# Patient Record
Sex: MALE | Race: WHITE
[De-identification: names, ages, dates, MRNs, and addresses within clinical notes are randomized per-mention and may not be internally consistent; named-entity substitution may affect disease eponyms.]

---

## 2017-05-16 ENCOUNTER — HOSPITAL ENCOUNTER (OUTPATIENT)
Dept: HOSPITAL 76 - LAB.F | Age: 67
Discharge: HOME | End: 2017-05-16
Attending: INTERNAL MEDICINE
Payer: MEDICARE

## 2017-05-16 DIAGNOSIS — I50.32: Primary | ICD-10-CM

## 2017-05-16 LAB
ANION GAP SERPL CALCULATED.4IONS-SCNC: 6 MMOL/L (ref 6–13)
BUN SERPL-MCNC: 26 MG/DL (ref 6–20)
CALCIUM UR-MCNC: 9.6 MG/DL (ref 8.5–10.3)
CHLORIDE SERPL-SCNC: 102 MMOL/L (ref 101–111)
CO2 SERPL-SCNC: 30 MMOL/L (ref 21–32)
CREAT SERPLBLD-SCNC: 1.4 MG/DL (ref 0.6–1.2)
GFRSERPLBLD MDRD-ARVRAT: 50 ML/MIN/{1.73_M2} (ref 89–?)
GLUCOSE SERPL-MCNC: 156 MG/DL (ref 70–100)
POTASSIUM SERPL-SCNC: 4.7 MMOL/L (ref 3.5–5)
SODIUM SERPLBLD-SCNC: 138 MMOL/L (ref 135–145)

## 2017-05-16 PROCEDURE — 80048 BASIC METABOLIC PNL TOTAL CA: CPT

## 2017-05-16 PROCEDURE — 36415 COLL VENOUS BLD VENIPUNCTURE: CPT

## 2017-05-30 ENCOUNTER — HOSPITAL ENCOUNTER (OUTPATIENT)
Dept: HOSPITAL 76 - LAB.F | Age: 67
Discharge: HOME | End: 2017-05-30
Attending: INTERNAL MEDICINE
Payer: MEDICARE

## 2017-05-30 DIAGNOSIS — I48.91: Primary | ICD-10-CM

## 2017-05-30 LAB
ANION GAP SERPL CALCULATED.4IONS-SCNC: 5 MMOL/L (ref 6–13)
BUN SERPL-MCNC: 22 MG/DL (ref 6–20)
CALCIUM UR-MCNC: 9.6 MG/DL (ref 8.5–10.3)
CHLORIDE SERPL-SCNC: 107 MMOL/L (ref 101–111)
CO2 SERPL-SCNC: 26 MMOL/L (ref 21–32)
CREAT SERPLBLD-SCNC: 1 MG/DL (ref 0.6–1.2)
GFRSERPLBLD MDRD-ARVRAT: 74 ML/MIN/{1.73_M2} (ref 89–?)
GLUCOSE SERPL-MCNC: 134 MG/DL (ref 70–100)
POTASSIUM SERPL-SCNC: 4.2 MMOL/L (ref 3.5–5)
SODIUM SERPLBLD-SCNC: 138 MMOL/L (ref 135–145)

## 2017-05-30 PROCEDURE — 36415 COLL VENOUS BLD VENIPUNCTURE: CPT

## 2017-05-30 PROCEDURE — 80048 BASIC METABOLIC PNL TOTAL CA: CPT

## 2020-06-15 ENCOUNTER — HOSPITAL ENCOUNTER (EMERGENCY)
Dept: HOSPITAL 76 - ED | Age: 70
Discharge: HOME | End: 2020-06-15
Payer: MEDICARE

## 2020-06-15 VITALS — SYSTOLIC BLOOD PRESSURE: 148 MMHG | DIASTOLIC BLOOD PRESSURE: 77 MMHG

## 2020-06-15 DIAGNOSIS — I48.91: ICD-10-CM

## 2020-06-15 DIAGNOSIS — N28.9: Primary | ICD-10-CM

## 2020-06-15 DIAGNOSIS — E11.9: ICD-10-CM

## 2020-06-15 DIAGNOSIS — Z95.2: ICD-10-CM

## 2020-06-15 DIAGNOSIS — Z79.84: ICD-10-CM

## 2020-06-15 DIAGNOSIS — Z91.81: ICD-10-CM

## 2020-06-15 DIAGNOSIS — R41.0: ICD-10-CM

## 2020-06-15 DIAGNOSIS — G89.29: ICD-10-CM

## 2020-06-15 DIAGNOSIS — R60.0: ICD-10-CM

## 2020-06-15 DIAGNOSIS — Z79.01: ICD-10-CM

## 2020-06-15 DIAGNOSIS — M17.11: ICD-10-CM

## 2020-06-15 DIAGNOSIS — Z79.82: ICD-10-CM

## 2020-06-15 DIAGNOSIS — I51.9: ICD-10-CM

## 2020-06-15 LAB
ALBUMIN DIAFP-MCNC: 3.8 G/DL (ref 3.2–5.5)
ALBUMIN/GLOB SERPL: 1.4 {RATIO} (ref 1–2.2)
ALP SERPL-CCNC: 45 IU/L (ref 42–121)
ALT SERPL W P-5'-P-CCNC: 26 IU/L (ref 10–60)
AMPHET UR QL SCN: NEGATIVE
ANION GAP SERPL CALCULATED.4IONS-SCNC: 11 MMOL/L (ref 6–13)
APAP SERPL-MCNC: < 10 UG/ML (ref 10–30)
AST SERPL W P-5'-P-CCNC: 58 IU/L (ref 10–42)
BASOPHILS NFR BLD AUTO: 0 10^3/UL (ref 0–0.1)
BASOPHILS NFR BLD AUTO: 0.4 %
BENZODIAZ UR QL SCN: POSITIVE
BILIRUB BLD-MCNC: 1.1 MG/DL (ref 0.2–1)
BUN SERPL-MCNC: 52 MG/DL (ref 6–20)
CALCIUM UR-MCNC: 8.5 MG/DL (ref 8.5–10.3)
CHLORIDE SERPL-SCNC: 95 MMOL/L (ref 101–111)
CLARITY UR REFRACT.AUTO: CLEAR
CO2 SERPL-SCNC: 26 MMOL/L (ref 21–32)
COCAINE UR-SCNC: NEGATIVE UMOL/L
CREAT SERPLBLD-SCNC: 2.9 MG/DL (ref 0.6–1.2)
EOSINOPHIL # BLD AUTO: 0.1 10^3/UL (ref 0–0.7)
EOSINOPHIL NFR BLD AUTO: 1.6 %
ERYTHROCYTE [DISTWIDTH] IN BLOOD BY AUTOMATED COUNT: 11.9 % (ref 12–15)
GLOBULIN SER-MCNC: 2.8 G/DL (ref 2.1–4.2)
GLUCOSE SERPL-MCNC: 94 MG/DL (ref 70–100)
GLUCOSE UR QL STRIP.AUTO: NEGATIVE MG/DL
HGB UR QL STRIP: 14.3 G/DL (ref 14–18)
KETONES UR QL STRIP.AUTO: NEGATIVE MG/DL
LIPASE SERPL-CCNC: 32 U/L (ref 22–51)
LYMPHOCYTES # SPEC AUTO: 1.5 10^3/UL (ref 1.5–3.5)
LYMPHOCYTES NFR BLD AUTO: 22.7 %
MCH RBC QN AUTO: 34 PG (ref 27–31)
MCHC RBC AUTO-ENTMCNC: 33.4 G/DL (ref 32–36)
MCV RBC AUTO: 101.7 FL (ref 80–94)
METHADONE UR QL SCN: NEGATIVE
METHAMPHET UR QL SCN: NEGATIVE
MONOCYTES # BLD AUTO: 1 10^3/UL (ref 0–1)
MONOCYTES NFR BLD AUTO: 14.7 %
NEUTROPHILS # BLD AUTO: 4.1 10^3/UL (ref 1.5–6.6)
NEUTROPHILS # SNV AUTO: 6.7 X10^3/UL (ref 4.8–10.8)
NEUTROPHILS NFR BLD AUTO: 60.3 %
NITRITE UR QL STRIP.AUTO: NEGATIVE
OPIATES UR QL SCN: NEGATIVE
PDW BLD AUTO: 9.7 FL (ref 7.4–11.4)
PH UR STRIP.AUTO: 7 PH (ref 5–7.5)
PLATELET # BLD: 142 10^3/UL (ref 130–450)
PROT SPEC-MCNC: 6.6 G/DL (ref 6.7–8.2)
PROT UR STRIP.AUTO-MCNC: NEGATIVE MG/DL
RBC # UR STRIP.AUTO: (no result) /UL
RBC MAR: 4.21 10^6/UL (ref 4.7–6.1)
SALICYLATES SERPL-MCNC: < 6 MG/DL
SODIUM SERPLBLD-SCNC: 132 MMOL/L (ref 135–145)
SP GR UR STRIP.AUTO: <=1.005 (ref 1–1.03)
UROBILINOGEN UR QL STRIP.AUTO: (no result) E.U./DL
UROBILINOGEN UR STRIP.AUTO-MCNC: NEGATIVE MG/DL
VOLATILE DRUGS POS SERPL SCN: (no result)

## 2020-06-15 PROCEDURE — 80053 COMPREHEN METABOLIC PANEL: CPT

## 2020-06-15 PROCEDURE — 81003 URINALYSIS AUTO W/O SCOPE: CPT

## 2020-06-15 PROCEDURE — 81001 URINALYSIS AUTO W/SCOPE: CPT

## 2020-06-15 PROCEDURE — 85025 COMPLETE CBC W/AUTO DIFF WBC: CPT

## 2020-06-15 PROCEDURE — 80306 DRUG TEST PRSMV INSTRMNT: CPT

## 2020-06-15 PROCEDURE — 72125 CT NECK SPINE W/O DYE: CPT

## 2020-06-15 PROCEDURE — 71045 X-RAY EXAM CHEST 1 VIEW: CPT

## 2020-06-15 PROCEDURE — 83690 ASSAY OF LIPASE: CPT

## 2020-06-15 PROCEDURE — 80320 DRUG SCREEN QUANTALCOHOLS: CPT

## 2020-06-15 PROCEDURE — 80329 ANALGESICS NON-OPIOID 1 OR 2: CPT

## 2020-06-15 PROCEDURE — 80307 DRUG TEST PRSMV CHEM ANLYZR: CPT

## 2020-06-15 PROCEDURE — 99284 EMERGENCY DEPT VISIT MOD MDM: CPT

## 2020-06-15 PROCEDURE — 70450 CT HEAD/BRAIN W/O DYE: CPT

## 2020-06-15 PROCEDURE — 83880 ASSAY OF NATRIURETIC PEPTIDE: CPT

## 2020-06-15 PROCEDURE — 87086 URINE CULTURE/COLONY COUNT: CPT

## 2020-06-15 PROCEDURE — 36415 COLL VENOUS BLD VENIPUNCTURE: CPT

## 2020-06-15 NOTE — CT REPORT
PROCEDURE:  CERVICAL SPINE WO

 

INDICATIONS:  fall, trauma.

 

TECHNIQUE:  

Noncontrast 3 mm thick sections acquired from the skull base to the T4 level.  Sagittal and coronal r
eformats were then constructed.  For radiation dose reduction, the following was used:  automated exp
osure control, adjustment of mA and/or kV according to patient size.

 

COMPARISON:  None.

 

FINDINGS:  

Image quality:  Excellent.  

 

Bones:  No fractures or dislocations.  Visualized superior ribs are intact. Spine degenerative disc d
isease and facet arthropathy are noted. 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 
C1 arch. Median sternotomy wires noted. Chronic appearing left first rib fracture.

 

Soft tissues:  Prevertebral soft tissues are normal in thickness.  No paravertebral hematomas.  No ap
ical pneumothoraces.  

 

 

IMPRESSION:  

 

No fracture. No acute osseous lesion. If there is continued clinical concern for pathology, then MRI 
should be considered for further evaluation.

 

Reviewed by: Mona Gonzalez MD, PhD on 6/15/2020 5:06 PM PDT

Approved by: Mona Gonzalez MD, PhD on 6/15/2020 5:06 PM PDT

 

 

Station ID:  [id number]

## 2020-06-15 NOTE — CT REPORT
PROCEDURE:  HEAD WO

 

INDICATIONS:  fall, poss head inj

 

TECHNIQUE:  

Noncontrast 4.5 mm thick angled axial sections acquired from the foramen magnum to the vertex.  For r
adiation dose reduction, the following was used:  automated exposure control, adjustment of mA and/or
 kV according to patient size.

 

COMPARISON:  CT cervical spine 6/15/2020

 

FINDINGS:  

Image quality:  Excellent.  

 

The ventricular system and cortical sulci demonstrate atrophy, consistent for patient's stated age.  
There are areas of hypodensity in the periventricular and subcortical white matter.  There is no acut
e intra or extra-axial fluid collection.  No acute hemorrhage, mass lesion or midline shift.  Brainst
em is unremarkable.  Globes are symmetrical. Sinuses are aerated. Osseous structures are intact.

 

IMPRESSION:  

 

1.  No acute intracranial process.

2.  Mild atrophy and chronic microvascular ischemic changes.

 

Reviewed by: Julia Cruz MD on 6/15/2020 5:00 PM PDT

Approved by: Julia Cruz MD on 6/15/2020 5:00 PM PDT

 

 

Station ID:  [id number]

## 2020-06-15 NOTE — ED PHYSICIAN DOCUMENTATION
PD HPI ALTERED MENTAL STATUS





- Stated complaint


Stated Complaint: FALL - POSSIBLE AMS





- History obtained from


History obtained from: Patient, Family





- History of Present Illness


Timing - onset: Other (69-year-old gentleman with history of heart disease and 

atrial fibrillation who presents with his wife, and they disagree on the reasons

for the visit. She says that he has been acting odd for the last 4 days like he 

was on narcotics but is not on any, and then last night she worries he may have 

fallen down the stairs, she was at work I guess and found a broken glass at the 

bottom of the stairs and assumed that he dropped it on the way down while he was

falling.  He says he has no specific complaint except for ongoing right knee 

pain, and he dropped the glass while bending over to  his small dog.)





Review of Systems


Ten Systems: 10 systems reviewed and negative


Constitutional: denies: Fever, Chills


Eyes: denies: Loss of vision, Decreased vision


Ears: denies: Loss of hearing, Ear pain


Nose: denies: Rhinorrhea / runny nose, Congestion


Throat: denies: Sore throat


Cardiac: denies: Chest pain / pressure, Palpitations


Respiratory: denies: Dyspnea, Cough





PD PAST MEDICAL HISTORY





- Past Medical History


Cardiovascular: Atrial fibrillation, Valve disorder


Respiratory: Sleep apnea


Endocrine/Autoimmune: Type 2 diabetes


GI: Colon polyps


: None


HEENT: None


Psych: None


Musculoskeletal: Osteoarthritis, Gout


Derm: None





- Past Surgical History


General: Colonoscopy


Cardiovascular: Valve replacement





- Present Medications


Home Medications: 


                                Ambulatory Orders











 Medication  Instructions  Recorded  Confirmed


 


Aspirin [Adult Low Dose Aspirin EC] 81 mg PO DAILY 02/04/16 03/17/16


 


Atorvastatin Calcium 40 mg PO DAILY 02/04/16 03/17/16


 


Carvedilol 12.5 mg PO BID 02/04/16 03/17/16


 


Colchicine 1.2 mg PO DAILY 02/04/16 03/17/16


 


Glipizide [Glipizide Xl] 2.5 mg PO DAILY 02/04/16 03/17/16


 


Oxycodone HCl/Acetaminophen 1 each PO QID 02/04/16 03/17/16





[Oxycodone-Acetaminophen ]   


 


Warfarin [Coumadin] 5 mg PO 1400 02/04/16 02/05/16


 


allopurinoL [Allopurinol] 100 mg PO DAILY 02/04/16 03/17/16


 


lisinopriL [Lisinopril] 10 mg PO DAILY 02/04/16 03/17/16


 


predniSONE [Prednisone] 60 mg PO DAILY 03/17/16 03/17/16














- Allergies


Allergies/Adverse Reactions: 


                                    Allergies











Allergy/AdvReac Type Severity Reaction Status Date / Time


 


ampicillin Allergy  Anaphylaxis Verified 02/04/16 14:45














- Social History


Does the pt have substance abuse?: No





- Family History


Family history: reports: Non contributory





PD ED PE NORMAL





- Vitals


Vital signs reviewed: Yes





- General


General: Alert and oriented X 3, Other (He is acting a little odd although he is

 alert and oriented.  He does have horizontal nystagmus.  Smallish pupils but 

reactive.)





- Neck


Neck: Supple, no meningeal sign, No bony TTP





- Respiratory


Respiratory: No respiratory distress, Clear bilaterally





- Abdomen


Abdomen: Non tender





- Extremities


Extremities: Other (Moderate bilateral pitting pedal edema at the calves, right 

knee has limited range of motion but is nontender.)





- Neuro


Neuro: Alert and oriented X 3, No motor deficit, No sensory deficit, Normal 

speech


Eye Opening: Spontaneous


Motor: Obeys Commands


Verbal: Confused (slight)


GCS Score: 14





Results





- Vitals


Vitals: 


                               Vital Signs - 24 hr











  06/15/20





  16:25


 


Temperature 36.6 C


 


Heart Rate 73


 


Respiratory 16





Rate 


 


Blood Pressure 142/110 H


 


O2 Saturation 96








                                     Oxygen











O2 Source                      Room air

















- Labs


Labs: 


                                Laboratory Tests











  06/15/20 06/15/20 06/15/20





  16:49 17:22 17:22


 


WBC   6.7 


 


RBC   4.21 L 


 


Hgb   14.3 


 


Hct   42.8 


 


MCV   101.7 H 


 


MCH   34.0 H 


 


MCHC   33.4 


 


RDW   11.9 L 


 


Plt Count   142 


 


MPV   9.7 


 


Neut # (Auto)   4.1 


 


Lymph # (Auto)   1.5 


 


Mono # (Auto)   1.0 


 


Eos # (Auto)   0.1 


 


Baso # (Auto)   0.0 


 


Absolute Nucleated RBC   0.00 


 


Nucleated RBC %   0.0 


 


Sodium    132 L


 


Potassium    4.1


 


Chloride    95 L


 


Carbon Dioxide    26


 


Anion Gap    11.0


 


BUN    52 H


 


Creatinine    2.9 H


 


Estimated GFR (MDRD)    22 L


 


Glucose    94


 


Calcium    8.5


 


Total Bilirubin    1.1 H


 


AST    58 H


 


ALT    26


 


Alkaline Phosphatase    45


 


B-Natriuretic Peptide   


 


Total Protein    6.6 L


 


Albumin    3.8


 


Globulin    2.8


 


Albumin/Globulin Ratio    1.4


 


Lipase    32


 


Urine Color  YELLOW  


 


Urine Clarity  CLEAR  


 


Urine pH  7.0  


 


Ur Specific Gravity  <=1.005  


 


Urine Protein  NEGATIVE  


 


Urine Glucose (UA)  NEGATIVE  


 


Urine Ketones  NEGATIVE  


 


Urine Occult Blood  TRACE-INTA  


 


Urine Nitrite  NEGATIVE  


 


Urine Bilirubin  NEGATIVE  


 


Urine Urobilinogen  0.2 (NORMAL)  


 


Ur Leukocyte Esterase  NEGATIVE  


 


Ur Microscopic Review  NOT INDICATED  


 


Urine Culture Comments  NOT INDICATED  


 


Salicylates    < 6.0


 


Urine Opiates Screen  NEGATIVE  


 


Ur Oxycodone Screen  POSITIVE H  


 


Urine Methadone Screen  NEGATIVE  


 


Ur Propoxyphene Screen  NEGATIVE  


 


Acetaminophen    < 10 L


 


Ur Barbiturates Screen  NEGATIVE  


 


Ur Tricyclics Screen  NEGATIVE  


 


Ur Phencyclidine Scrn  NEGATIVE  


 


Ur Amphetamine Screen  NEGATIVE  


 


U Methamphetamines Scrn  NEGATIVE  


 


U Benzodiazepines Scrn  POSITIVE H  


 


Urine Cocaine Screen  NEGATIVE  


 


U Cannabinoids Screen  NEGATIVE  


 


Ethyl Alcohol    < 5.0














  06/15/20





  17:22


 


WBC 


 


RBC 


 


Hgb 


 


Hct 


 


MCV 


 


MCH 


 


MCHC 


 


RDW 


 


Plt Count 


 


MPV 


 


Neut # (Auto) 


 


Lymph # (Auto) 


 


Mono # (Auto) 


 


Eos # (Auto) 


 


Baso # (Auto) 


 


Absolute Nucleated RBC 


 


Nucleated RBC % 


 


Sodium 


 


Potassium 


 


Chloride 


 


Carbon Dioxide 


 


Anion Gap 


 


BUN 


 


Creatinine 


 


Estimated GFR (MDRD) 


 


Glucose 


 


Calcium 


 


Total Bilirubin 


 


AST 


 


ALT 


 


Alkaline Phosphatase 


 


B-Natriuretic Peptide  125 H


 


Total Protein 


 


Albumin 


 


Globulin 


 


Albumin/Globulin Ratio 


 


Lipase 


 


Urine Color 


 


Urine Clarity 


 


Urine pH 


 


Ur Specific Gravity 


 


Urine Protein 


 


Urine Glucose (UA) 


 


Urine Ketones 


 


Urine Occult Blood 


 


Urine Nitrite 


 


Urine Bilirubin 


 


Urine Urobilinogen 


 


Ur Leukocyte Esterase 


 


Ur Microscopic Review 


 


Urine Culture Comments 


 


Salicylates 


 


Urine Opiates Screen 


 


Ur Oxycodone Screen 


 


Urine Methadone Screen 


 


Ur Propoxyphene Screen 


 


Acetaminophen 


 


Ur Barbiturates Screen 


 


Ur Tricyclics Screen 


 


Ur Phencyclidine Scrn 


 


Ur Amphetamine Screen 


 


U Methamphetamines Scrn 


 


U Benzodiazepines Scrn 


 


Urine Cocaine Screen 


 


U Cannabinoids Screen 


 


Ethyl Alcohol 














PD MEDICAL DECISION MAKING





- ED course


ED course: 





69-year-old gentleman presents with mild confusion/altered mental status.  He 

has an extensive medical history.  There was some confusion about his 

medications but we were Able to ascertain is that about 10 days ago he started 

furosemide because he gained great weight recently.  I suspect this drove him 

into mild acute renal insufficiency which caused his oxycodone levels which he 

takes for his need to go up.  We discussed admission to the hospital for IV 

hydration and medication management but he very much wanted to be discharged.  

He will stop the water pill for now and follow-up with his doctor for recheck 

within a day or 2.





Departure





- Departure


Disposition: 01 Home, Self Care


Clinical Impression: 


 Renal insufficiency, Status post fall





Altered mental status


Qualifiers:


 Altered mental status type: delirium Qualified Code(s): R41.0 - Disorientation,

 unspecified





Knee pain


Qualifiers:


 Chronicity: chronic Laterality: right Qualified Code(s): M25.561 - Pain in 

right knee; G89.29 - Other chronic pain





Condition: Good


Record reviewed to determine appropriate education?: Yes


Instructions:  ED Dehydration, ED Confusion


Comments: 


Return anytime if worsening, follow-up with your primary care physician tomorrow

 or the next day, will likely want to recheck labs to make sure that your acute 

renal insufficiency is improving off of the water pills.  Do not take your water

 pills.  Try to take as little oxycodone for your chronic pain as possible.

## 2020-06-15 NOTE — XRAY REPORT
PROCEDURE:  Knee 3 View RT

 

INDICATIONS:  knee pain

 

TECHNIQUE:  3 views of the right knee(s) were acquired.  

 

COMPARISON:  None.

 

FINDINGS:  

 

Bones:  No fractures or dislocations.  No suspicious bony lesions. Mild lateral compartment osteophyt
ic degenerative change. Moderate medial and patellofemoral compartment osteoarthritis change. 

 

Soft tissues:  No joint effusion.  No suspicious soft tissue calcifications.  

 

 

IMPRESSION:  

 

1. Tricompartmental osteoarthritis.

 

2. No fracture. No acute osseous lesion. If there is continued clinical concern for pathology, then r
epeat plain film radiographs (7-10 days) or advanced imaging (CT, MR, bone scan) should be considered
 for further evaluation.

 Knee

 

Reviewed by: Mona Gonzalez MD, PhD on 6/15/2020 6:05 PM PDT

Approved by: Mona Gonzalez MD, PhD on 6/15/2020 6:05 PM PDT

 

 

Station ID:  [id number]

## 2020-06-15 NOTE — XRAY REPORT
PROCEDURE:  Chest 1 View X-Ray

 

INDICATIONS:  dyspnea

 

TECHNIQUE:  One view of the chest was acquired.  

 

COMPARISON:  None

 

FINDINGS:  

 

Surgical changes and devices: Median sternotomy wires.

 

Lungs and pleura:  No pleural effusions or pneumothorax.  Lungs are clear.  

 

Mediastinum:  Mediastinal contours appear normal.  Heart size is normal.  

 

Bones and chest wall:  No suspicious bony lesions.  Overlying soft tissues appear unremarkable.  

 

 

IMPRESSION:  

 

No acute cardiopulmonary disease process.

 

Reviewed by: Mona Gonzalez MD, PhD on 6/15/2020 6:03 PM PDT

Approved by: Mona Gonzalez MD, PhD on 6/15/2020 6:03 PM PDT

 

 

Station ID:  [id number]

## 2023-07-06 ENCOUNTER — HOSPITAL ENCOUNTER (OUTPATIENT)
Dept: HOSPITAL 76 - DI.S | Age: 73
Discharge: HOME | End: 2023-07-06
Attending: STUDENT IN AN ORGANIZED HEALTH CARE EDUCATION/TRAINING PROGRAM
Payer: MEDICARE

## 2023-07-06 DIAGNOSIS — Z53.9: Primary | ICD-10-CM

## 2023-07-07 ENCOUNTER — HOSPITAL ENCOUNTER (OUTPATIENT)
Dept: HOSPITAL 76 - DI.S | Age: 73
Discharge: HOME | End: 2023-07-07
Attending: STUDENT IN AN ORGANIZED HEALTH CARE EDUCATION/TRAINING PROGRAM
Payer: MEDICARE

## 2023-07-07 DIAGNOSIS — R05.9: Primary | ICD-10-CM

## 2023-07-07 DIAGNOSIS — Z86.16: ICD-10-CM

## 2023-07-07 NOTE — XRAY REPORT
PROCEDURE:  Chest 2 View X-Ray

 

INDICATIONS:  COUGH

 

TECHNIQUE:  2 views of the chest were acquired.  

 

COMPARISON:  Chest radiograph 6/15/2020

 

FINDINGS:  

 

Surgical changes and devices:  Sternotomy wires and prosthetic heart valve are noted.

 

Lungs and pleura:  No pleural effusions or pneumothorax.  Lungs are clear.  

 

Mediastinum:  Mediastinal contours appear normal.  Heart size is normal.  

 

Bones and chest wall:  No suspicious bony lesions.  Overlying soft tissues appear unremarkable.   Sev
ere degenerative changes in the shoulders.

 

IMPRESSION:  

 

No acute cardiopulmonary process.

 

 

 

Reviewed by: Eulalio Baron MD on 7/7/2023 11:51 AM PDT

Approved by: Eulalio Baron MD on 7/7/2023 11:51 AM PDT

 

 

Station ID:  [id number]